# Patient Record
(demographics unavailable — no encounter records)

---

## 2025-02-07 NOTE — HEALTH RISK ASSESSMENT
[Excellent] : ~his/her~  mood as  excellent [No] : In the past 12 months have you used drugs other than those required for medical reasons? No [0] : 2) Feeling down, depressed, or hopeless: Not at all (0) [PHQ-2 Negative - No further assessment needed] : PHQ-2 Negative - No further assessment needed [Audit-CScore] : 0 [MHC2Sscha] : 0 [Patient reported PAP Smear was normal] : Patient reported PAP Smear was normal [None] : None [With Family] : lives with family [Employed] : employed [] :  [PapSmearDate] : 01/24 [Reviewed no changes] : Reviewed, no changes [AdvancecareDate] : 02/25 [Never] : Never

## 2025-02-07 NOTE — ASSESSMENT
[FreeTextEntry1] : Vaccines up to date C/w Prenatal F/u w/ Gyn for routine prenatal testing  -F/u labs drawn in office today -Further recs pending lab results -RTO yearly for CPE or sooner if needed

## 2025-02-07 NOTE — HISTORY OF PRESENT ILLNESS
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 Non-smoker. Occasional EtOH use.   BERNABE is a 29 year F whom is here today for an annual well check and to establish care w/ a new PMD Today, pt reports feeling well and is w/o complaints.   Specialists Involved: -OBGYN: Dr. Guillen   -Vaccines: up to date -Pap Smear: 1/2024 -FH of Colon, breast or ovarian CA: None known  -RICH: not on medication.

## 2025-03-06 NOTE — PHYSICAL EXAM
[Chaperone Present] : A chaperone was present in the examining room during all aspects of the physical examination [FreeTextEntry2] : rox siddiqui [Appropriately responsive] : appropriately responsive [Alert] : alert [No Acute Distress] : no acute distress [No Lymphadenopathy] : no lymphadenopathy [Regular Rate Rhythm] : regular rate rhythm [No Murmurs] : no murmurs [Clear to Auscultation B/L] : clear to auscultation bilaterally [Soft] : soft [Non-tender] : non-tender [Non-distended] : non-distended [No HSM] : No HSM [No Lesions] : no lesions [No Mass] : no mass [Oriented x3] : oriented x3 [Examination Of The Breasts] : a normal appearance [No Masses] : no breast masses were palpable [Labia Majora] : normal [Labia Minora] : normal [Normal] : normal [Uterine Adnexae] : normal

## 2025-03-06 NOTE — HISTORY OF PRESENT ILLNESS
[FreeTextEntry1] : 29-year-old white female G1, P0 presents as a new patient to this practice with positive UCG.  Her LMP is 1/4/2025 giving her an EDC of 10/11/2025 and EGA of 8 weeks and 5 days.  She is currently on prenatal vitamins.  She denies any significant nausea or vomiting.  She denies any vaginal bleeding.  She has no significant medical history.  Surgical history of lumbar laminectomy.  She denies alcohol tobacco drug use during the pregnancy.  Patient is a multiple physical therapist.

## 2025-03-06 NOTE — DISCUSSION/SUMMARY
[FreeTextEntry1] : Pap smear is performed.  I discussed the findings of the sonogram with her.  In general I reassured her with lower risk of miscarriage in the setting of AGA fetus with cardiac activity.  I discussed possibility of difficulty getting epidural secondary to history of lumbar laminectomy.  We may consider anesthesia consult during pregnancy.  She will return in 1 to 2 weeks for first prenatal visit and blood work.  All questions were answered.

## 2025-03-06 NOTE — PROCEDURE
[Transvaginal OB Sonogram] : Transvaginal OB Sonogram [Intrauterine Pregnancy] : intrauterine pregnancy [Yolk Sac] : yolk sac present [Fetal Heart] : fetal heart present [Date: ___] : Date: [unfilled] [Current GA by Sonogram: ___ (wks)] : Current GA by Sonogram: [unfilled]Uwks [___ day(s)] : [unfilled] days [Transvaginal OB Sonogram WNL] : Transvaginal OB Sonogram WNL [FreeTextEntry1] : AVRIL

## 2025-03-18 NOTE — HISTORY OF PRESENT ILLNESS
[FreeTextEntry1] : reason :   palpitations    This is a 29 year old woman  with recent diagnosis of pregnancy first pregnancy  11 weeks pregnant,   hjere with palpitations.  she has been having palpitations for the last 2- 3 weeks.  she has been feeling palpitation fast heart beat.  last few few seconds.  and goes up to 130-140.  she takes giselle deep breath  and relax to see if it resolved.  it can happen any time of the day . no sycnope.  some discomfort.     usually she has been able to do treadmill.  and now that she is pregnancy stairs are othering her.  she  tried to drink enough water.    No smoking. No alcohol. No drugs.      Family history:  Father:  no myocardial infarction. no Cerebro vascular accident   hypertension  Mother :  no myocardial infarction. No cerebro vascular accident ;  coronary artery disease . stentss  Siblings:  No myocardial infarction.  No CVA

## 2025-03-18 NOTE — DISCUSSION/SUMMARY
[Patient] : the patient [Risks] : risks [Benefits] : benefits [Alternatives] : alternatives [With Me] : with me [___ Month(s)] : in [unfilled] month(s) [EKG obtained to assist in diagnosis and management of assessed problem(s)] : EKG obtained to assist in diagnosis and management of assessed problem(s) [FreeTextEntry1] : This is a 29 year old woman  with recent diagnosis of pregnancyl first pregnancy  11 weeks pregnant,   hjere with palpitations.   1) palpitations :  2 weeks event monitor  .   hydration.  2) dyspnea on exertion":    only on when she is on the stairs.  transthoracic echocardiogram

## 2025-03-18 NOTE — CARDIOLOGY SUMMARY
[de-identified] : 3 18 2025:   Sinus Rhythm  WITHIN NORMAL LIMITS [de-identified] : feb 2025> LDL 77.  HDL: 62 Total: 152 . Tgs: 61

## 2025-07-23 NOTE — HISTORY OF PRESENT ILLNESS
[FreeTextEntry1] : 30-year-old white female para 0 at 28+ weeks gestation complaining of some vulvar itching that she has had for several months.  She denies any abnormal vaginal discharge.  Patient has no OB complaints.  Reports good fetal movement.  Denies any previous history of psoriasis or skin conditions

## 2025-07-23 NOTE — DISCUSSION/SUMMARY
[FreeTextEntry1] : On exam no pathologic discharge was noted but yeast bacterial GC chlamydia cultures were performed.  I discussed some possibility of a vulvar dystrophy here.  We discussed considering a biopsy when she is not pregnant.  For now patient was given a prescription for Lotrisone cream and I discussed how to use it.

## 2025-07-23 NOTE — PHYSICAL EXAM
[MA] : MA [FreeTextEntry2] : lebron castaneda [Labia Majora] : normal [Labia Minora] : normal [Normal] : normal [Uterine Adnexae] : normal [FreeTextEntry1] : Small area of leukoplakia around the clitoral mclean